# Patient Record
Sex: FEMALE | Race: WHITE
[De-identification: names, ages, dates, MRNs, and addresses within clinical notes are randomized per-mention and may not be internally consistent; named-entity substitution may affect disease eponyms.]

---

## 2017-05-01 NOTE — ER DOCUMENT REPORT
ED General





- General


Chief Complaint: possible spider bite R forearm


Stated Complaint: BITE ON ARM


Mode of Arrival: Ambulatory


Information source: Patient


Notes: 


22 yr old female presents wit hocmplaints of right forearm bite. pt notes pain 

redness and swelling, denies any previous ismilar episode


TRAVEL OUTSIDE OF THE [place] IN LAST 30 DAYS: No





- HPI


Onset: Other - 3 days


Onset/Duration: Persistent


Quality of pain: Achy


Severity: Mild


Pain Level: 1


Associated symptoms: Other


Exacerbated by: Denies


Relieved by: Denies


Similar symptoms previously: No


Recently seen / treated by doctor: No





- Related Data


Allergies/Adverse Reactions: 


 





No Known Allergies Allergy (Unverified 03/17/13 11:53)


 











Past Medical History





- Social History


Smoking Status: Current Every Day Smoker


Cigarette use (# per day): Yes


Chew tobacco use (# tins/day): No


Smoking Education Provided: No


Family History: Reviewed & Not Pertinent


Renal/ Medical History: Denies: Hx Peritoneal Dialysis


Past Surgical History: Reports: Hx Appendectomy





- Immunizations


Immunizations up to date: Yes


Hx Diphtheria, Pertussis, Tetanus Vaccination: Yes





Review of Systems





- Review of Systems


Constitutional: No symptoms reported


EENT: No symptoms reported


Cardiovascular: No symptoms reported


Respiratory: No symptoms reported


Gastrointestinal: No symptoms reported


Genitourinary: No symptoms reported


Female Genitourinary: No symptoms reported


Musculoskeletal: No symptoms reported


Skin: Other


Hematologic/Lymphatic: No symptoms reported


Neurological/Psychological: No symptoms reported





Physical Exam





- Vital signs


Vitals: 


 











Temp Pulse Resp BP Pulse Ox


 


 98.3 F   94   16   114/60   99 


 


 05/01/17 20:49  05/01/17 20:49  05/01/17 20:49  05/01/17 20:49  05/01/17 20:49











Interpretation: Normal





- General


General appearance: Appears well, Alert





- HEENT


Head: Normocephalic, Atraumatic


Eyes: Normal


Pupils: PERRL





- Respiratory


Respiratory status: No respiratory distress


Chest status: Nontender


Breath sounds: Normal


Chest palpation: Normal





- Cardiovascular


Rhythm: Regular


Heart sounds: Normal auscultation


Murmur: No





- Abdominal


Inspection: Normal


Distension: No distension


Bowel sounds: Normal


Tenderness: Nontender


Organomegaly: No organomegaly





- Back


Back: Normal, Nontender





- Extremities


General upper extremity: Normal inspection, Nontender, Normal color, Normal ROM

, Normal temperature


General lower extremity: Normal inspection, Nontender, Normal color, Normal ROM

, Normal temperature, Normal weight bearing.  No: Agustina's sign





- Neurological


Neuro grossly intact: Yes


Cognition: Normal


Orientation: AAOx4


Maritza Coma Scale Eye Opening: Spontaneous


Moville Coma Scale Verbal: Oriented


Moville Coma Scale Motor: Obeys Commands


Maritza Coma Scale Total: 15


Speech: Normal


Motor strength normal: LUE, RUE, LLE, RLE


Sensory: Normal





- Psychological


Associated symptoms: Normal affect, Normal mood





- Skin


Skin Temperature: Hot


Skin Moisture: Dry


Skin Color: Normal, Other - pt has multiple sunbruns on body superificial right 

forearm area of fluctuance meausiring 3x2 cm with erythema and tenderness and 

drainage





Course





- Re-evaluation


Re-evalutation: 


05/01/17 23:47


area was anesthesized and incised, pt will be started on antibiotics, close 

precautions provided





05/01/17 23:54


After performing a Medical Screening Examination, I estimate there is LOW risk 

for OPEN FRACTURE, COMPARTMENT SYNDROME, TENDON RUPTURE, ACUTE NEUROVASCULAR 

INJURY, or RETAINED FOREIGN BODY, thus I consider the discharge disposition 

reasonable. Also, there is no evidence or peritonitis, sepsis, or toxicity.  I 

have reevaluated this patient multiple times and no significant life 

threatening changes are noted. The patient and I have discussed the diagnosis 

and risks, and we agree with discharging home with close follow-up with the 

understanding that symptoms and presentations can change. We also discussed 

returning to the Emergency Department immediately if new or worsening symptoms 

occur. We have discussed the symptoms which are most concerning (e.g., changing 

or worsening pain, fever, numbness, weakness, cool or painful digits) that 

necessitate immediate return.





- Vital Signs


Vital signs: 


 











Temp Pulse Resp BP Pulse Ox


 


 98.3 F   94   16   114/60   99 


 


 05/01/17 20:49  05/01/17 20:49  05/01/17 20:49  05/01/17 20:49  05/01/17 20:49














Procedures





- Incision and Drainage


  ** Right Arm


Time completed: 23:54


Type: Simple


Anesthetic type: 1% Lidocaine


mL's of anesthetic: 5


Blade size: 11


I&D procedure: Sterile dressing applied


Incision Method: Incision made by scalpel


Amount/type of drainage: moderate amount of pus





Discharge





- Discharge


Clinical Impression: 


 Abscess of right forearm





Condition: Stable


Disposition: HOME, SELF-CARE


Instructions:  Post Incision and Drainage, MRSA Cellulitis ([facility])


Additional Instructions: 


Please follow-up in 2-3 days for reevaluation or return immediately if symptoms 

are worsening


Prescriptions: 


Cephalexin Monohydrate [Keflex 500 mg Capsule] 500 mg PO QID #40 capsule


Sulfamethoxazole/Trimethoprim [Bactrim Ds Tablet] 2 each PO BID #40 tablet

## 2017-05-17 NOTE — ER DOCUMENT REPORT
ED Flu Like





- General


Chief Complaint: Flu Symptoms


Stated Complaint: BODY PAIN/FEVER


Time Seen by Provider: 05/17/17 03:25


Mode of Arrival: Ambulatory


Information source: Patient


Notes: 


Patient is a 22-year-old  female who presents to the ER today for 1 

day of low back pain all across the low back, fever, headache, chills and body 

aches.  Patient states that her father was diagnosed with pneumonia yesterday.  

She does admit to a cough but no shortness of breath, chest pain, trouble 

breathing.  She denies any dysuria, hematuria, history of kidney stones.  She 

denies any sore throat, runny nose or other upper respiratory symptoms.


TRAVEL OUTSIDE OF THE [place] IN LAST 30 DAYS: No





- Related Data


Allergies/Adverse Reactions: 


 





No Known Allergies Allergy (Unverified 03/17/13 11:53)


 











Past Medical History





- General


Information source: Patient





- Social History


Smoking Status: Unknown if Ever Smoked


Family History: Reviewed & Not Pertinent


Patient has suicidal ideation: No


Patient has homicidal ideation: No


Renal/ Medical History: Denies: Hx Peritoneal Dialysis


Past Surgical History: Reports: Hx Appendectomy





- Immunizations


Immunizations up to date: Yes


Hx Diphtheria, Pertussis, Tetanus Vaccination: Yes





Review of Systems





- Review of Systems


Constitutional: See HPI


EENT: No symptoms reported


Cardiovascular: No symptoms reported


Respiratory: See HPI


Gastrointestinal: No symptoms reported


Genitourinary: No symptoms reported


Female Genitourinary: No symptoms reported


Musculoskeletal: No symptoms reported


Skin: No symptoms reported


Hematologic/Lymphatic: No symptoms reported


Neurological/Psychological: No symptoms reported





Physical Exam





- Vital signs


Vitals: 





 











Temp Pulse Resp BP Pulse Ox


 


 98.5 F   88   14   113/70   100 


 


 05/16/17 23:56  05/16/17 23:56  05/16/17 23:56  05/16/17 23:56  05/16/17 23:56














- Notes


Notes: 


PHYSICAL EXAMINATION: 


GENERAL: Mildly ill-appearing, but in no acute distress. 


HEAD: Atraumatic, normocephalic. 


EYES: Pupils equal round and reactive to light, extraocular movements intact, 

sclera anicteric, conjunctiva are normal. 


ENT: ear canals without erythema or foreign body, TMs pearly grey with good 

bony landmarks, nares patent, oropharynx clear without exudates. Moist mucous 

membranes. 


NECK: Normal range of motion, supple without lymphadenopathy 


LUNGS: CTAB and equal. No wheezes rales or rhonchi. 


HEART: Regular rate and rhythm without murmurs


ABDOMEN: Soft, no tenderness. No guarding, no rebound 


BACK: no vertebral tenderness, normal ROM


GI/: Bilateral CVA tenderness


EXTREMITIES: Normal range of motion, no pitting edema. No cyanosis. 


NEUROLOGICAL: Cranial nerves grossly intact. Normal sensory/motor exams. 


PSYCH: Normal mood, normal affect. 


SKIN: Warm, Dry, normal turgor, no rashes or lesions noted 

















Course





- Vital Signs


Vital signs: 





 











Temp Pulse Resp BP Pulse Ox


 


 98.5 F   88   14   113/70   100 


 


 05/16/17 23:56  05/16/17 23:56  05/16/17 23:56  05/16/17 23:56  05/16/17 23:56

## 2018-02-08 NOTE — ER DOCUMENT REPORT
ED General





- General


Chief Complaint: Abdominal Pain


Stated Complaint: ABDOMINAL PAIN


Time Seen by Provider: 18 17:07


Mode of Arrival: Ambulatory


Information source: Patient


Notes: 





22-year-old female  4 para 3 presents with complaints of bilateral lower 

abd pain. pt denies any fevers chills, nausea vomiting or diarrhea. pt notes 

that the pain moves from left to right . pt is approx 15 weeks pregnant 


TRAVEL OUTSIDE OF THE [place] IN LAST 30 DAYS: No





- HPI


Onset: Other


Onset/Duration: Intermittent


Quality of pain: Cramping


Severity: Mild


Pain Level: 1


Associated symptoms: Other


Exacerbated by: Denies


Relieved by: Denies


Similar symptoms previously: No


Recently seen / treated by doctor: No





- Related Data


Allergies/Adverse Reactions: 


 





No Known Allergies Allergy (Verified 18 16:46)


 











Past Medical History





- Social History


Smoking Status: Current Some Day Smoker


Cigarette use (# per day): Yes


Chew tobacco use (# tins/day): No


Smoking Education Provided: Yes - Patient counselled regarding cessation for 4 

minutes


Frequency of alcohol use: None


Drug Abuse: None


Family History: Reviewed & Not Pertinent


Patient has suicidal ideation: No


Patient has homicidal ideation: No


Renal/ Medical History: Denies: Hx Peritoneal Dialysis


Past Surgical History: Reports: Hx Appendectomy





- Immunizations


Immunizations up to date: Yes


Hx Diphtheria, Pertussis, Tetanus Vaccination: Yes





Review of Systems





- Review of Systems


Notes: 





REVIEW OF SYSTEMS:


CONSTITUTIONAL :  Denies fever,  chills, or sweats.  Denies recent illness.


EENT:   Denies eye, ear, throat, or mouth pain or symptoms.  Denies nasal or 

sinus congestion or discharge.  Denies throat, tongue, or mouth swelling or 

difficulty swallowing.


CARDIOVASCULAR:  Denies chest pain.  Denies palpitations or racing or irregular 

heart beat.  Denies ankle edema.


RESPIRATORY:  Denies cough, cold, or chest congestion.  Denies shortness of 

breath, difficulty breathing, or wheezing.


GASTROINTESTINAL:  abdominal pain


GENITOURINARY:  Denies difficulty urinating, painful urination, burning, 

frequency, blood in urine, or discharge.


FEMALE  GENITOURINARY:  Denies vaginal bleeding, heavy or abnormal periods, 

irregular periods.  Denies vaginal discharge or odor. 


MUSCULOSKELETAL:  Denies back or neck pain or stiffness.  Denies joint pain or 

swelling.


SKIN:   Denies rash, lesions or sores.


HEMATOLOGIC :   Denies easy bruising or bleeding.


LYMPHATIC:  Denies swollen, enlarged glands.


NEUROLOGICAL:  Denies confusion or altered mental status.  Denies passing out 

or loss of consciousness.  Denies dizziness or lightheadedness.  Denies 

headache.  Denies weakness or paralysis or loss of use of either side.  Denies 

problems with gait or speech.  Denies sensory loss, numbness, or tingling.  

Denies seizures.


PSYCHIATRIC:  Denies anxiety or stress.  Denies depression, suicidal ideation, 

or homicidal ideation.





ALL OTHER SYSTEMS REVIEWED AND NEGATIVE.











PHYSICAL EXAMINATION:





GENERAL: Well-appearing, well-nourished and in no acute distress.





HEAD: Atraumatic, normocephalic.





EYES: Pupils equal round and reactive to light, extraocular movements intact, 

conjunctiva are normal.





ENT: Nares patent, oropharynx clear without exudates.  Moist mucous membranes.





NECK: Normal range of motion, supple without lymphadenopathy





LUNGS: Breath sounds clear to auscultation bilaterally and equal.  No wheezes 

rales or rhonchi.





HEART: Regular rate and rhythm without murmurs





ABDOMEN: Gravid abdomen





Female : deferred





Musculoskeletal: Normal range of motion, no pitting or edema.  No cyanosis.





NEUROLOGICAL: Cranial nerves grossly intact.  Normal speech, normal gait.  

Normal sensory, motor exams





PSYCH: Normal mood, normal affect.





SKIN: Warm, Dry, normal turgor, no rashes or lesions noted.

















Dictation was performed using Dragon voice recognition software





Physical Exam





- Vital signs


Vitals: 


 











Temp Pulse Resp BP Pulse Ox


 


 98.5 F   87   16   122/61   100 


 


 18 16:48  18 16:48  18 16:48  18 16:48  18 16:48














Course





- Re-evaluation


Re-evalutation: 





18 17:31


Patient appears 15 weeks pregnant ultrasound pending


18 20:40





Ultrasound was consistent with 15 week pregnancy patient overall looks well is 

in no distress, patient will be discharged home with close follow-up otherwise 

no life-threatening issues are noted she looks well and is stable for discharge








After performing a Medical Screening Examination, I estimate there is LOW risk 

for ACUTE APPENDICITIS, BOWEL OBSTRUCTION, ACUTE CHOLECYSTITIS, PERFORATED 

DIVERTICULITIS, INCARCERATED HERNIA, PANCREATITIS, PELVIC INFLAMMATORY DISEASE, 

PERFORATED ULCER, ECTOPIC PREGNANCY, or TUBO-OVARIAN ABSCESS, thus I consider 

the discharge disposition reasonable. Also, there is no evidence or peritonitis

, sepsis, or toxicity. I have reevaluated this patient multiple times and no 

significant life threatening changes are noted. The patient and I have 

discussed the diagnosis and risks, and we agree with discharging home with 

close follow-up with the understanding that symptoms and presentations can 

change. We also discussed returning to the Emergency Department immediately if 

new or worsening symptoms occur. We have discussed the symptoms which are most 

concerning (e.g., bloody stool, fever, changing or worsening pain, vomiting) 

that necessitate immediate return.





- Vital Signs


Vital signs: 


 











Temp Pulse Resp BP Pulse Ox


 


 98.5 F   87   16   122/61   100 


 


 18 16:48  18 16:48  18 16:48  18 16:48  18 16:48














- Laboratory


Result Diagrams: 


 18 18:03





 18 18:03


Laboratory results interpreted by me: 


 











  18





  18:03 18:03 18:03


 


RBC  3.33 L  


 


Hgb  10.5 L  


 


Hct  29.9 L  


 


Creatinine   0.44 L 


 


Total Bilirubin   < 0.1 L 


 


Total Protein   6.0 L 


 


Urine Ascorbic Acid    40 H














- Diagnostic Test


Radiology reviewed: Image reviewed, Reports reviewed - report given to the 

patient





Discharge





- Discharge


Clinical Impression: 


Pelvic pain affecting pregnancy


Qualifiers:


 Trimester: second trimester Qualified Code(s): O26.892 - Other specified 

pregnancy related conditions, second trimester; R10.2 - Pelvic and perineal pain

; R10.2 - Pelvic and perineal pain





Condition: Stable


Disposition: HOME, SELF-CARE


Instructions:  Pelvic Pain in Pregnancy and Round Ligament Pain ([facility])


Additional Instructions: 


Follow up with your physician tomorrow for further care or return to the ED 

IMMEDIATELY if symptoms worsen or new concerns occur. If you cannot afford to 

follow up with your primary care physician a list of low cost clinics have been 

provided at the end of your discharge papers as well.

## 2018-02-08 NOTE — RADIOLOGY REPORT (SQ)
EXAM DESCRIPTION:  U/S OB 14+ TRNABD 1GES W/O DOP



COMPLETED DATE/TIME:  2/8/2018 8:13 pm



REASON FOR STUDY:  + preg, inguinal pain bilateral



COMPARISON:  None.



TECHNIQUE:  Static and Dynamic grayscale imaging performed of gravid uterus using transabdominal appr
oach.  Additional selected color Doppler and spectral images recorded.  All stored on PACS.



LIMITATIONS:  None.



FINDINGS:  EGA: 15 weeks 4 days

VINITA: 7/29/2018

EFW: Not indicated

PERCENTILE: Not indicated

CRYSTAL: Adequate

PLACENTA: Fundal FETAL PRESENTATION: Cephalic.

FETAL ANATOMY:

FETAL HEART RATE: 150 beats per minute.

FOUR CHAMBER HEART: Not visualized

THREE VESSEL CORD: Yes.

CORD INSERTION: Visualized.

KIDNEYS AND BLADDER: Not visualized

STOMACH: Not visualized

SPINE: Normal as visualized.

BRAIN AND LATERAL VENTRICLES: Not visualized

OTHER: No other significant finding.

MATERNAL ADNEXA: Maternal ovaries not visualized.

CERVICAL LENGTH: 2.9 cm   Closed.

OTHER: No other significant finding.



IMPRESSION:  LIVING INTRAUTERINE PREGNANCY.

ESTIMATED GESTATIONAL AGE 15 weeks 4 days.

Limited anatomic evaluation because of extensive fetal activity.

NO VISUALIZED ANOMALIES.

Trimester of pregnancy: Second trimester - 13 weeks 1 day to 27 weeks 6 days.



TECHNICAL DOCUMENTATION:  JOB ID:  [id number]

 2011 [employer]- All Rights Reserved

## 2018-03-15 ENCOUNTER — HOSPITAL ENCOUNTER (EMERGENCY)
Dept: HOSPITAL 62 - ER | Age: 23
Discharge: HOME | End: 2018-03-15
Payer: SELF-PAY

## 2018-03-15 VITALS — DIASTOLIC BLOOD PRESSURE: 49 MMHG | SYSTOLIC BLOOD PRESSURE: 110 MMHG

## 2018-03-15 DIAGNOSIS — R51: ICD-10-CM

## 2018-03-15 DIAGNOSIS — O99.519: Primary | ICD-10-CM

## 2018-03-15 DIAGNOSIS — Z3A.00: ICD-10-CM

## 2018-03-15 DIAGNOSIS — J06.9: ICD-10-CM

## 2018-03-15 DIAGNOSIS — H92.01: ICD-10-CM

## 2018-03-15 DIAGNOSIS — O99.330: ICD-10-CM

## 2018-03-15 DIAGNOSIS — R05: ICD-10-CM

## 2018-03-15 DIAGNOSIS — J02.9: ICD-10-CM

## 2018-03-15 DIAGNOSIS — O26.899: ICD-10-CM

## 2018-03-15 PROCEDURE — 99282 EMERGENCY DEPT VISIT SF MDM: CPT

## 2018-03-15 NOTE — ER DOCUMENT REPORT
HPI





- HPI


Pain Level: 2


Notes: 





Patient is a 23-year-old female who is approximately 19 weeks pregnant who 

presents to the ED complaining of nasal congestion/discharge, dry nonproductive 

cough, occ body ache and occ right ear pain 2-3 days.  Patient states that she 

is still eating and drinking without difficulties, but does have a decreased 

p.o. intake.  She is still urinating normally having normal bowel movements.  

Patient has not used any over-the-counter meds for symptoms.  She denies any 

significant past medical history including cardiopulmonary history and 

immunocompromised conditions.  Patient denies IV drug use, but admits to 

smoking.  Denies any current headache, neck pain, sore throat, chest pain, 

palpitations, syncope, shortness of breath, wheeze, dyspnea, abdominal pain, 

nausea/vomiting/diarrhea, urinary retention, dysuria, hematuria, or rash.





- ROS


Systems Reviewed and Negative: Yes All other systems reviewed and negative





- CONSTITUTIONAL


Constitutional: DENIES: Fever, Chills





- EENT


EENT: REPORTS: Sore Throat, Ear Pain - right earache





- NEURO


Neurology: REPORTS: Headache





- RESPIRATORY


Respiratory: REPORTS: Coughing





- REPRODUCTIVE


Reproductive: REPORTS: Pregnant:





Past Medical History





- Social History


Smoking Status: Current Every Day Smoker


Frequency of alcohol use: None


Drug Abuse: None


Family History: Reviewed & Not Pertinent


Patient has suicidal ideation: No


Patient has homicidal ideation: No


Renal/ Medical History: Denies: Hx Peritoneal Dialysis


Past Surgical History: Reports: Hx Appendectomy





- Immunizations


Immunizations up to date: Yes


Hx Diphtheria, Pertussis, Tetanus Vaccination: Yes





Vertical Provider Document





- CONSTITUTIONAL


Agree With Documented VS: Yes


Notes: 





PHYSICAL EXAMINATION:





GENERAL: Well-appearing, well-nourished and in no acute distress.  A&Ox4.  

Answers questions appropriately.  Moves comfortably w/o notable distress





HEAD: Atraumatic, normocephalic.





EYES: Pupils equal round and reactive to light, extraocular movements intact, 

sclera anicteric, conjunctiva are normal.





ENT: EAC clear b/l.  TM's intact b/l without erythema, fluid, or perforation.  

Nares patent and with clear discharge.  oropharynx no erythema without 

exudates.  No tonsilar hypertrophy without erythema or exudate.  No palatine 

shift.  Uvula midline.  No tongue protrusion.  No drooling, hoarseness, or 

airway compromise.  Moist mucous membranes.  No sinus tenderness.





NECK: Normal range of motion, supple without lymphadenopathy.  No rigidity/

meningismus.





LUNGS: Breath sounds clear to auscultation bilaterally and equal.  No wheezes 

rales or rhonchi.  No retractions





HEART: Regular rate and rhythm without murmurs, rubs, gallops.





ABDOMEN: Soft, nontender, nondistended abdomen.  No guarding, no rebound.  No 

masses appreciated.  Normal bowel sounds present.  No CVA tenderness 

bilaterally.  No hepatosplenomegaly.





NEUROLOGICAL: Normal speech, normal gait.  Normal sensory, motor exams 





PSYCH: Normal mood, normal affect.





SKIN: Warm, Dry, normal turgor, no rashes or lesions noted.





- INFECTION CONTROL


TRAVEL OUTSIDE OF THE [place] IN LAST 30 DAYS: No





- RESPIRATORY


O2 Sat by Pulse Oximetry: 100





Course





- Re-evaluation


Re-evalutation: 





03/15/18 10:40


Patient is an afebrile, well-hydrated, 23-year-old female who presents to the 

ED with acute URI, suspect influenza.  Vitals are stable.  PE is otherwise 

unremarkable.  No labs or imaging warranted at this time based on H&P, no 

influenza tests available at this time.  Patient has no significant 

cardiopulmonary or immunocompromised medical conditions aside from being 

pregnant.  Patient's lungs are clear to auscultation bilaterally without 

significant tachycardia, hypoxia, or tachypnea.  Patient is tolerating p.o. 

without any difficulties.  I would recommend patient to be on tamiflu due to 

being pregnant, but pt has no insurance.  Thoroughly reviewed the risks, 

benefits, potential side effects, estimated cost without insurance with 

patient.  After thorough review, patient declined Tamiflu at this time.  Low 

suspicion for any meningitis, sepsis, peritonsillar/pharyngeal abscess, 

respiratory compromise, severe dehydration, or other emergent systemic 

condition at this time.  Patient is aware this condition can change from 

initial presentation and she needs to monitor symptoms closely.  Conservative 

measures otherwise for symptoms.  Recheck with your PCM in 3-5 days.  Return to 

the ED with any worsening/concerning symptoms otherwise as reviewed in 

discharge.  Patient is in agreement.





- Vital Signs


Vital signs: 


 











Temp Pulse Resp BP Pulse Ox


 


 98.5 F   102 H  15   110/49 L  100 


 


 03/15/18 10:10  03/15/18 10:10  03/15/18 10:10  03/15/18 10:10  03/15/18 10:10














Discharge





- Discharge


Clinical Impression: 


 Acute URI





Condition: Stable


Disposition: HOME, SELF-CARE


Instructions:  Upper Respiratory Illness ([facility])


Additional Instructions: 


Maintain adequate fluid intake


Take meds as directed


tylenol as needed


nasal saline, mucinex as needed


Humidified air may help


Wash your hands regularly


Wear a mask when coughing


F/u:  with your PCM/OBGYN in 3-5 days for a recheck





Return to the ED with any fever, worsening pain, chest pain, palpitations, 

syncope, worsening HA, neck pain/stiffness, shortness of breath, wheezing, 

drooling, trouble swallowing/breathing, abdominal pain, n/v/d, rash, or 

worsening/concerning symptoms otherwise.


Referrals: 


[facility] [Provider Group] - Follow up as needed


[facility] [Provider Group] - Follow up as needed

## 2018-07-24 ENCOUNTER — HOSPITAL ENCOUNTER (INPATIENT)
Dept: HOSPITAL 62 - LC | Age: 23
LOS: 2 days | Discharge: HOME | End: 2018-07-26
Attending: OBSTETRICS & GYNECOLOGY | Admitting: OBSTETRICS & GYNECOLOGY
Payer: MEDICAID

## 2018-07-24 DIAGNOSIS — O34.211: ICD-10-CM

## 2018-07-24 DIAGNOSIS — Z3A.39: ICD-10-CM

## 2018-07-24 DIAGNOSIS — F17.210: ICD-10-CM

## 2018-07-24 LAB
ADD MANUAL DIFF: NO
APPEARANCE UR: (no result)
APTT PPP: YELLOW S
BARBITURATES UR QL SCN: NEGATIVE
BASOPHILS # BLD AUTO: 0 10^3/UL (ref 0–0.2)
BASOPHILS NFR BLD AUTO: 0.3 % (ref 0–2)
BILIRUB UR QL STRIP: NEGATIVE
EOSINOPHIL # BLD AUTO: 0.2 10^3/UL (ref 0–0.6)
EOSINOPHIL NFR BLD AUTO: 1.4 % (ref 0–6)
ERYTHROCYTE [DISTWIDTH] IN BLOOD BY AUTOMATED COUNT: 14.4 % (ref 11.5–14)
GLUCOSE UR STRIP-MCNC: NEGATIVE MG/DL
HCT VFR BLD CALC: 27.2 % (ref 36–47)
HGB BLD-MCNC: 9.5 G/DL (ref 12–15.5)
KETONES UR STRIP-MCNC: NEGATIVE MG/DL
LYMPHOCYTES # BLD AUTO: 2.4 10^3/UL (ref 0.5–4.7)
LYMPHOCYTES NFR BLD AUTO: 21.6 % (ref 13–45)
MCH RBC QN AUTO: 30.4 PG (ref 27–33.4)
MCHC RBC AUTO-ENTMCNC: 34.8 G/DL (ref 32–36)
MCV RBC AUTO: 88 FL (ref 80–97)
METHADONE UR QL SCN: NEGATIVE
MONOCYTES # BLD AUTO: 0.8 10^3/UL (ref 0.1–1.4)
MONOCYTES NFR BLD AUTO: 6.7 % (ref 3–13)
NEUTROPHILS # BLD AUTO: 7.9 10^3/UL (ref 1.7–8.2)
NEUTS SEG NFR BLD AUTO: 70 % (ref 42–78)
NITRITE UR QL STRIP: NEGATIVE
PCP UR QL SCN: NEGATIVE
PH UR STRIP: 5 [PH] (ref 5–9)
PLATELET # BLD: 304 10^3/UL (ref 150–450)
PROT UR STRIP-MCNC: NEGATIVE MG/DL
RBC # BLD AUTO: 3.11 10^6/UL (ref 3.72–5.28)
SP GR UR STRIP: 1.02
TOTAL CELLS COUNTED % (AUTO): 100 %
URINE AMPHETAMINES SCREEN: NEGATIVE
URINE BENZODIAZEPINES SCREEN: NEGATIVE
URINE COCAINE SCREEN: NEGATIVE
URINE MARIJUANA (THC) SCREEN: NEGATIVE
UROBILINOGEN UR-MCNC: NEGATIVE MG/DL (ref ?–2)
WBC # BLD AUTO: 11.3 10^3/UL (ref 4–10.5)

## 2018-07-24 PROCEDURE — 86900 BLOOD TYPING SEROLOGIC ABO: CPT

## 2018-07-24 PROCEDURE — 86592 SYPHILIS TEST NON-TREP QUAL: CPT

## 2018-07-24 PROCEDURE — 80307 DRUG TEST PRSMV CHEM ANLYZR: CPT

## 2018-07-24 PROCEDURE — 85027 COMPLETE CBC AUTOMATED: CPT

## 2018-07-24 PROCEDURE — 81005 URINALYSIS: CPT

## 2018-07-24 PROCEDURE — 86850 RBC ANTIBODY SCREEN: CPT

## 2018-07-24 PROCEDURE — 4A1HXCZ MONITORING OF PRODUCTS OF CONCEPTION, CARDIAC RATE, EXTERNAL APPROACH: ICD-10-PCS | Performed by: OBSTETRICS & GYNECOLOGY

## 2018-07-24 PROCEDURE — 76819 FETAL BIOPHYS PROFIL W/O NST: CPT

## 2018-07-24 PROCEDURE — 94760 N-INVAS EAR/PLS OXIMETRY 1: CPT

## 2018-07-24 PROCEDURE — 36415 COLL VENOUS BLD VENIPUNCTURE: CPT

## 2018-07-24 PROCEDURE — 94799 UNLISTED PULMONARY SVC/PX: CPT

## 2018-07-24 PROCEDURE — 85025 COMPLETE CBC W/AUTO DIFF WBC: CPT

## 2018-07-24 PROCEDURE — 88307 TISSUE EXAM BY PATHOLOGIST: CPT

## 2018-07-24 PROCEDURE — 86901 BLOOD TYPING SEROLOGIC RH(D): CPT

## 2018-07-24 RX ADMIN — IBUPROFEN SCH: 800 TABLET, FILM COATED ORAL at 11:33

## 2018-07-24 RX ADMIN — OXYCODONE AND ACETAMINOPHEN PRN TAB: 5; 325 TABLET ORAL at 21:08

## 2018-07-24 RX ADMIN — IBUPROFEN SCH: 800 TABLET, FILM COATED ORAL at 11:34

## 2018-07-24 RX ADMIN — SIMETHICONE PRN MG: 80 TABLET, CHEWABLE ORAL at 21:08

## 2018-07-24 RX ADMIN — KETOROLAC TROMETHAMINE SCH MG: 30 INJECTION, SOLUTION INTRAMUSCULAR at 11:41

## 2018-07-24 RX ADMIN — DOCUSATE SODIUM SCH MG: 100 CAPSULE, LIQUID FILLED ORAL at 18:53

## 2018-07-24 RX ADMIN — DOCUSATE SODIUM SCH MG: 100 CAPSULE, LIQUID FILLED ORAL at 09:55

## 2018-07-24 RX ADMIN — HYDROMORPHONE HYDROCHLORIDE PRN MG: 2 INJECTION INTRAMUSCULAR; INTRAVENOUS; SUBCUTANEOUS at 07:40

## 2018-07-24 RX ADMIN — Medication SCH CAP: at 09:55

## 2018-07-24 RX ADMIN — KETOROLAC TROMETHAMINE SCH MG: 30 INJECTION, SOLUTION INTRAMUSCULAR at 06:56

## 2018-07-24 RX ADMIN — IBUPROFEN SCH: 800 TABLET, FILM COATED ORAL at 17:26

## 2018-07-24 RX ADMIN — OXYCODONE AND ACETAMINOPHEN PRN TAB: 5; 325 TABLET ORAL at 14:33

## 2018-07-24 RX ADMIN — HYDROMORPHONE HYDROCHLORIDE PRN MG: 2 INJECTION INTRAMUSCULAR; INTRAVENOUS; SUBCUTANEOUS at 11:40

## 2018-07-24 RX ADMIN — KETOROLAC TROMETHAMINE SCH MG: 30 INJECTION, SOLUTION INTRAMUSCULAR at 19:12

## 2018-07-24 RX ADMIN — IBUPROFEN SCH MG: 800 TABLET, FILM COATED ORAL at 23:02

## 2018-07-24 RX ADMIN — OXYCODONE AND ACETAMINOPHEN PRN TAB: 5; 325 TABLET ORAL at 10:06

## 2018-07-24 NOTE — OPERATIVE REPORT
Operative Report


DATE OF SURGERY: 18


PREOPERATIVE DIAGNOSIS: Patient presenting at 39 weeks with a biophysical 

profile of 2 out of 8 and an obvious fetal heart block


POSTOPERATIVE DIAGNOSIS: Same


OPERATION: Repeat  via low transverse uterine incision


SURGEON: NAYELI MARCOS


ANESTHESIA: Spinal


TISSUE REMOVED OR ALTERED: Placenta


COMPLICATIONS: 





None


ESTIMATED BLOOD LOSS: 250 cc


INTRAOPERATIVE FINDINGS: Viable female infant crying at delivery


PROCEDURE: 





Patient was taken to the OR and placed in supine position after her spinal 

anesthesia.  She is prepared and draped in sterile fashion.  Cunningham was placed 

for drainage of the bladder.  Low transverse incision was made and carried down 

the level of the fascia.  The fascial incision was made with knife and extended 

bilaterally with curved Pyle scissors.  The fascia was  off the rectus 

muscles using sharp and blunt dissection.  The rectus muscles are  in 

the midline.  The peritoneum was entered without incident.  Bladder blade was 

placed in uterine segment was identified.  A low transverse incision was made 

creating a bladder flap.  Bladder blade was placed low transverse uterine 

incision was made with the knife and extended with fingertips.  The baby was 

delivered with some fundal pressure.  Mouth and nose were suctioned free.  The 

cord is doubly clamped and cut.  Baby is passed off to the pediatrician in 

attendance.  The placenta was manually extracted with trailing membranes.  The 

uterus was externalized  wrapped in a moist lap sponge.  Uterine contents wiped 

free.  Uterus was closed with a running locking layer of 0 chromic suture using 

the second layer to imbricate the first completing a double layer closure of 

the uterus.  The serosa was closed with a running 2-0 chromic stitch.  The 

pelvis was irrigated and suctioned free of fluid the uterus was replaced in the 

abdomen.  The abdominal wall peritoneum was closed with running 2-0 chromic 

stitch.  Fascia was closed with a running 0 Vicryl in 2 segments.  Devon's 

layer was brought together with 0 plain gut stitch and the skin was closed with 

running subcuticular 4-0 undyed Vicryl stitch.  The wound was dressed mother 

and baby did well.

## 2018-07-24 NOTE — RADIOLOGY REPORT (SQ)
EXAM DESCRIPTION: 



US FETAL BIOPHYSICAL PROFILE WITHOUT NON STRESS TEST



COMPLETED DATE/TME:  2018 00:00



CLINICAL HISTORY: 



23 years, Female, Biophysical profile for fetal well-being



COMPARISON:

None.



TECHNIQUE:

Biophysical profile performed.





FINDINGS:



Fetal breathin



Fetal posture: 0



Fetal movement: 0



Amniotic fluid volume: 2. Amniotic fluid index of 12.1



Fetal heart rate of 121 bpm.



IMPRESSION:





1. Biophysical profile score: 2 out of 8.



2. Single live intrauterine pregnancy with fetal heart rate of

121 bpm.

 



 2011 [employer]- All Rights Reserved

## 2018-07-25 LAB
ERYTHROCYTE [DISTWIDTH] IN BLOOD BY AUTOMATED COUNT: 14.2 % (ref 11.5–14)
HCT VFR BLD CALC: 23.8 % (ref 36–47)
HGB BLD-MCNC: 8.2 G/DL (ref 12–15.5)
MCH RBC QN AUTO: 30.7 PG (ref 27–33.4)
MCHC RBC AUTO-ENTMCNC: 34.6 G/DL (ref 32–36)
MCV RBC AUTO: 89 FL (ref 80–97)
PLATELET # BLD: 276 10^3/UL (ref 150–450)
RBC # BLD AUTO: 2.68 10^6/UL (ref 3.72–5.28)
WBC # BLD AUTO: 10.1 10^3/UL (ref 4–10.5)

## 2018-07-25 RX ADMIN — Medication SCH CAP: at 09:06

## 2018-07-25 RX ADMIN — IBUPROFEN SCH MG: 800 TABLET, FILM COATED ORAL at 23:16

## 2018-07-25 RX ADMIN — OXYCODONE AND ACETAMINOPHEN PRN TAB: 5; 325 TABLET ORAL at 01:08

## 2018-07-25 RX ADMIN — OXYCODONE AND ACETAMINOPHEN PRN TAB: 5; 325 TABLET ORAL at 05:53

## 2018-07-25 RX ADMIN — IBUPROFEN SCH MG: 800 TABLET, FILM COATED ORAL at 12:50

## 2018-07-25 RX ADMIN — KETOROLAC TROMETHAMINE SCH: 30 INJECTION, SOLUTION INTRAMUSCULAR at 20:50

## 2018-07-25 RX ADMIN — IBUPROFEN SCH MG: 800 TABLET, FILM COATED ORAL at 05:52

## 2018-07-25 RX ADMIN — DOCUSATE SODIUM SCH MG: 100 CAPSULE, LIQUID FILLED ORAL at 17:59

## 2018-07-25 RX ADMIN — DOCUSATE SODIUM SCH MG: 100 CAPSULE, LIQUID FILLED ORAL at 09:06

## 2018-07-25 RX ADMIN — KETOROLAC TROMETHAMINE SCH: 30 INJECTION, SOLUTION INTRAMUSCULAR at 05:43

## 2018-07-25 RX ADMIN — OXYCODONE AND ACETAMINOPHEN PRN TAB: 5; 325 TABLET ORAL at 09:59

## 2018-07-25 RX ADMIN — OXYCODONE AND ACETAMINOPHEN PRN TAB: 5; 325 TABLET ORAL at 18:00

## 2018-07-25 RX ADMIN — OXYCODONE AND ACETAMINOPHEN PRN TAB: 5; 325 TABLET ORAL at 14:18

## 2018-07-25 RX ADMIN — IBUPROFEN SCH MG: 800 TABLET, FILM COATED ORAL at 17:59

## 2018-07-25 RX ADMIN — OXYCODONE AND ACETAMINOPHEN PRN TAB: 5; 325 TABLET ORAL at 22:14

## 2018-07-25 NOTE — PDOC PROGRESS REPORT
Subjective-OB


Progress Note for:: 18


Subjective: 





pt doing well no complaints





Physical Exam (OB)


Vital Signs: 


 











Temp Pulse Resp BP Pulse Ox


 


 97.8 F   90   16   107/77   100 


 


 18 08:06  18 08:06  18 08:06  18 08:06  18 08:06








 Intake & Output











 18





 06:59 06:59 06:59


 


Output Total  850 


 


Balance  -850 


 


Weight 75.3 kg  














- PIH/Pre-Eclampsia


DTR's: 1 +


Clonus: Negative


Headache: Absent


Epigastric Pain: No


Visual Changes: No





- 


Incision: Dressing


Closure Type: OP Site





- Lochia


Lochia Amount: Small 10-25 ml


Lochia Color: Rubra/Red





- Abdomen


Description: Tender, Soft


Hernia Present: No


Bowel Sounds: Normoactive


Flatus Presence: Present


Fundal Description: Firm, Midline


Fundal Height: u/u - u/2





- Respiratory


Breath sounds: Clear





- Extremities


Calf: Nontender





Objective-Diagnostic


Laboratory: 


 





 18 06:16 





 











  18





  06:16


 


WBC  10.1


 


RBC  2.68 L


 


Hgb  8.2 L


 


Hct  23.8 L


 


MCV  89


 


MCH  30.7


 


MCHC  34.6


 


RDW  14.2 H


 


Plt Count  276














Assessment and Plan(PN)





- Assessment and Plan


(1) Delivery by emergency caesarean section


Is this a current diagnosis for this admission?: Yes   





- Time Spent with Patient


Time with patient: Less than 15 minutes


Medications reviewed and adjusted accordingly: Yes





- Disposition


Anticipated Discharge: Home


Within: within 24 hours

## 2018-07-26 VITALS — DIASTOLIC BLOOD PRESSURE: 54 MMHG | SYSTOLIC BLOOD PRESSURE: 106 MMHG

## 2018-07-26 RX ADMIN — IBUPROFEN SCH MG: 800 TABLET, FILM COATED ORAL at 05:55

## 2018-07-26 RX ADMIN — IBUPROFEN SCH MG: 800 TABLET, FILM COATED ORAL at 17:06

## 2018-07-26 RX ADMIN — KETOROLAC TROMETHAMINE SCH: 30 INJECTION, SOLUTION INTRAMUSCULAR at 03:56

## 2018-07-26 RX ADMIN — OXYCODONE AND ACETAMINOPHEN PRN TAB: 5; 325 TABLET ORAL at 12:31

## 2018-07-26 RX ADMIN — DOCUSATE SODIUM SCH MG: 100 CAPSULE, LIQUID FILLED ORAL at 17:07

## 2018-07-26 RX ADMIN — IBUPROFEN SCH MG: 800 TABLET, FILM COATED ORAL at 12:43

## 2018-07-26 RX ADMIN — OXYCODONE AND ACETAMINOPHEN PRN TAB: 5; 325 TABLET ORAL at 04:20

## 2018-07-26 RX ADMIN — KETOROLAC TROMETHAMINE SCH: 30 INJECTION, SOLUTION INTRAMUSCULAR at 12:41

## 2018-07-26 RX ADMIN — OXYCODONE AND ACETAMINOPHEN PRN TAB: 5; 325 TABLET ORAL at 17:07

## 2018-07-26 RX ADMIN — OXYCODONE AND ACETAMINOPHEN PRN TAB: 5; 325 TABLET ORAL at 08:20

## 2018-07-26 RX ADMIN — Medication SCH CAP: at 12:30

## 2018-07-26 RX ADMIN — SIMETHICONE PRN MG: 80 TABLET, CHEWABLE ORAL at 01:02

## 2018-07-26 RX ADMIN — DOCUSATE SODIUM SCH MG: 100 CAPSULE, LIQUID FILLED ORAL at 12:31

## 2018-07-26 NOTE — PDOC DISCHARGE SUMMARY
Final Diagnosis


Discharge Date: 18





- Final Diagnosis


(1) Status post repeat low transverse  section


Is this a current diagnosis for this admission?: Yes   





Discharge Data





- Discharge Medication


Prescriptions: 


Ferrous Sulfate [Feosol 325 mg Tablet] 325 mg PO BID #60 tablet


Ibuprofen [Motrin 800 mg Tablet] 800 mg PO Q8HP PRN #90 tablet


 PRN Reason: 


Oxycodone HCl/Acetaminophen [Percocet 5-325 mg Tablet] 1 tab PO Q4HP PRN #30 

tablet


 PRN Reason: 


Home Medications: 








Docusate Sodium [Colace 100 mg Capsule] 100 mg PO BID  capsule 18 


Ferrous Sulfate [Feosol 325 mg Tablet] 325 mg PO BID #60 tablet 18 


Ibuprofen [Motrin 800 mg Tablet] 800 mg PO Q8HP PRN #90 tablet 18 


Oxycodone HCl/Acetaminophen [Percocet 5-325 mg Tablet] 1 tab PO Q4HP PRN #30 

tablet 18 


Prenatal Vit/Dha [Prenatal Multi + Dha Capsule] 1 cap PO DAILY  capsule  








Reason(s) for Admission: Onset of Labor


Prenatal Procedures: NST


Intrapartum Procedure(s): : Low Cervical, Transverse





- Diagnosis Test


Laboratory: 


 











Temp Pulse Resp BP Pulse Ox


 


 97.7 F   79   12   113/62   100 


 


 18 07:45  18 07:45  18 07:45  18 07:45  18 07:45








 











  18





  00:45 03:41 06:16


 


RBC   3.11 L  2.68 L


 


Hgb   9.5 L  8.2 L


 


Hct   27.2 L  23.8 L


 


Urine Opiates Screen  NEGATIVE  














- Discharge information/Instructions


Discharge Activity: Balance Activity w/Rest, No Lifting/Push/Pulling, Pelvic 

Rest, Slowly Increase Activity, No tub bath


Discharge Diet: Regular


Disposition: HOME, SELF-CARE


Follow up with: [facility]


in: 1, Weeks